# Patient Record
Sex: FEMALE | Race: WHITE | NOT HISPANIC OR LATINO | Employment: FULL TIME | URBAN - METROPOLITAN AREA
[De-identification: names, ages, dates, MRNs, and addresses within clinical notes are randomized per-mention and may not be internally consistent; named-entity substitution may affect disease eponyms.]

---

## 2023-01-28 PROBLEM — M54.17 RADICULITIS, LUMBOSACRAL: Status: ACTIVE | Noted: 2023-01-28

## 2023-01-28 PROBLEM — I25.10 ARTERIOSCLEROTIC HEART DISEASE (ASHD): Status: ACTIVE | Noted: 2023-01-28

## 2023-01-28 PROBLEM — F32.A DEPRESSION: Status: ACTIVE | Noted: 2023-01-28

## 2023-01-28 PROBLEM — H61.23 BILATERAL IMPACTED CERUMEN: Status: ACTIVE | Noted: 2023-01-28

## 2023-01-28 PROBLEM — E78.5 HYPERLIPIDEMIA: Status: ACTIVE | Noted: 2023-01-28

## 2023-01-28 PROBLEM — J06.9 VIRAL UPPER RESPIRATORY INFECTION: Status: ACTIVE | Noted: 2023-01-28

## 2023-01-28 PROBLEM — K21.9 GERD (GASTROESOPHAGEAL REFLUX DISEASE): Status: ACTIVE | Noted: 2023-01-28

## 2023-01-28 PROBLEM — M65.331 TRIGGER MIDDLE FINGER OF RIGHT HAND: Status: ACTIVE | Noted: 2023-01-28

## 2023-01-28 PROBLEM — R39.15 URGENCY OF URINATION: Status: ACTIVE | Noted: 2023-01-28

## 2023-01-28 PROBLEM — M79.643 HAND PAIN: Status: ACTIVE | Noted: 2023-01-28

## 2023-01-28 PROBLEM — R60.9 EDEMA: Status: ACTIVE | Noted: 2023-01-28

## 2023-01-28 PROBLEM — E55.9 VITAMIN D DEFICIENCY: Status: ACTIVE | Noted: 2023-01-28

## 2023-01-28 PROBLEM — M79.609 LIMB PAIN: Status: ACTIVE | Noted: 2023-01-28

## 2023-01-28 PROBLEM — M79.669 CALF PAIN: Status: ACTIVE | Noted: 2023-01-28

## 2023-01-28 PROBLEM — M65.342 TRIGGER RING FINGER OF LEFT HAND: Status: ACTIVE | Noted: 2023-01-28

## 2023-01-28 PROBLEM — R05.9 COUGH: Status: ACTIVE | Noted: 2023-01-28

## 2023-01-28 PROBLEM — M65.332 TRIGGER MIDDLE FINGER OF LEFT HAND: Status: ACTIVE | Noted: 2023-01-28

## 2023-01-28 PROBLEM — M79.662 PAIN OF LEFT CALF: Status: ACTIVE | Noted: 2023-01-28

## 2023-01-28 PROBLEM — S92.351A CLOSED FRACTURE OF FIFTH METATARSAL BONE OF RIGHT FOOT: Status: ACTIVE | Noted: 2023-01-28

## 2023-01-28 PROBLEM — I10 BENIGN ESSENTIAL HYPERTENSION: Status: ACTIVE | Noted: 2023-01-28

## 2023-01-28 RX ORDER — PAROXETINE 30 MG/1
1 TABLET, FILM COATED ORAL DAILY
COMMUNITY
Start: 2012-09-28 | End: 2023-03-24 | Stop reason: SDUPTHER

## 2023-01-28 RX ORDER — AMLODIPINE BESYLATE 5 MG/1
1 TABLET ORAL DAILY
COMMUNITY
Start: 2021-07-23 | End: 2024-02-23 | Stop reason: SDUPTHER

## 2023-01-28 RX ORDER — SPIRONOLACTONE 25 MG/1
1 TABLET ORAL DAILY
COMMUNITY
Start: 2021-10-14 | End: 2024-02-23 | Stop reason: SDUPTHER

## 2023-01-28 RX ORDER — ATORVASTATIN CALCIUM 40 MG/1
1 TABLET, FILM COATED ORAL NIGHTLY
COMMUNITY
Start: 2012-10-21 | End: 2024-03-04 | Stop reason: SDUPTHER

## 2023-01-28 RX ORDER — FIBER
1 TABLET ORAL DAILY
COMMUNITY
Start: 2013-09-20

## 2023-01-28 RX ORDER — METOPROLOL SUCCINATE 50 MG/1
1 TABLET, EXTENDED RELEASE ORAL DAILY
COMMUNITY
Start: 2012-09-28 | End: 2024-02-23 | Stop reason: SDUPTHER

## 2023-01-28 RX ORDER — LISINOPRIL 40 MG/1
1 TABLET ORAL DAILY
COMMUNITY
Start: 2012-10-21 | End: 2024-02-23 | Stop reason: SDUPTHER

## 2023-01-29 PROBLEM — Z04.9 CONDITION NOT FOUND: Status: ACTIVE | Noted: 2023-01-29

## 2023-03-13 ENCOUNTER — OFFICE VISIT (OUTPATIENT)
Dept: PRIMARY CARE | Facility: CLINIC | Age: 73
End: 2023-03-13
Payer: MEDICARE

## 2023-03-13 ENCOUNTER — LAB (OUTPATIENT)
Dept: LAB | Facility: LAB | Age: 73
End: 2023-03-13
Payer: MEDICARE

## 2023-03-13 VITALS — WEIGHT: 180 LBS | DIASTOLIC BLOOD PRESSURE: 85 MMHG | SYSTOLIC BLOOD PRESSURE: 145 MMHG | BODY MASS INDEX: 31.89 KG/M2

## 2023-03-13 DIAGNOSIS — F32.A DEPRESSION, UNSPECIFIED DEPRESSION TYPE: ICD-10-CM

## 2023-03-13 DIAGNOSIS — R53.83 OTHER FATIGUE: ICD-10-CM

## 2023-03-13 DIAGNOSIS — Z00.00 HEALTH CARE MAINTENANCE: ICD-10-CM

## 2023-03-13 DIAGNOSIS — I10 BENIGN ESSENTIAL HYPERTENSION: Primary | ICD-10-CM

## 2023-03-13 DIAGNOSIS — E78.2 MIXED HYPERLIPIDEMIA: ICD-10-CM

## 2023-03-13 DIAGNOSIS — I10 BENIGN ESSENTIAL HYPERTENSION: ICD-10-CM

## 2023-03-13 PROCEDURE — 3079F DIAST BP 80-89 MM HG: CPT | Performed by: INTERNAL MEDICINE

## 2023-03-13 PROCEDURE — 3077F SYST BP >= 140 MM HG: CPT | Performed by: INTERNAL MEDICINE

## 2023-03-13 PROCEDURE — 80061 LIPID PANEL: CPT

## 2023-03-13 PROCEDURE — 36415 COLL VENOUS BLD VENIPUNCTURE: CPT

## 2023-03-13 PROCEDURE — 80053 COMPREHEN METABOLIC PANEL: CPT

## 2023-03-13 PROCEDURE — 85027 COMPLETE CBC AUTOMATED: CPT

## 2023-03-13 PROCEDURE — 99214 OFFICE O/P EST MOD 30 MIN: CPT | Performed by: INTERNAL MEDICINE

## 2023-03-13 PROCEDURE — 1170F FXNL STATUS ASSESSED: CPT | Performed by: INTERNAL MEDICINE

## 2023-03-13 PROCEDURE — G0439 PPPS, SUBSEQ VISIT: HCPCS | Performed by: INTERNAL MEDICINE

## 2023-03-13 NOTE — PROGRESS NOTES
Subjective   Patient ID: Sully Chavez is a 73 y.o. female who presents for No chief complaint on file..    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 further away now rather enjoys that better bowels normal no dysuria bones muscles joints mostly aching at this point but not taking much in the way of medication for it except for over-the-counter    Past medical history hypertension hyperlipidemia fatigue depression and anxiety    Medications including SSRI    Allergies no known drug allergies    Social history no tobacco    Family history noted and unchanged    Prevention has had follow-up with cardiology some prior blood work review goes to [facility] for her mammogram some exercise    Depression not depressed but on medication    Review of Systems    Objective   There were no vitals taken for this visit.    Physical Exam vital signs noted alert and oriented x3 NCAT PERRLA EOMI nares without discharge OP benign TM normal bilateral EAC clear bilateral no AC nodes no JVD or bruit no thyromegaly chest clear to auscultation and percussion CV regular rate and rhythm S1-S2 without murmur gallop or rub abdomen soft nontender normal active bowel sounds no rebound or guarding no HSM LS spine normal curvature negative straight leg raise negative logroll negative SI joint tenderness extremities no clubbing cyanosis or edema normal distal pulses DTR 2+ and brisk musculoskeletal DJD changes hands without synovial effusion    Assessment/Plan impression General medical examination hypertension hyperlipidemia depression other diagnoses fatigue  Plan has follow-up with cardiology in June 2023 and EKG at that point in time diet weight loss exercise increase water consumption check Chem-7 advised on glucose potassium and kidney function advised on blood pressure blood pressure  medicine check hepatic panel lipid panel advised on cholesterol cholesterol medicine check CBC advised on blood count review prior findings for her thyroid function studies were other findings check on most recent colonoscopy follow-up at [facility] for mammogram sleep hygiene recheck 3 months based on labs TT 60 cc 31    Reviewed prior diagnoses and laboratory for TSH which was normal in 2017

## 2023-03-14 LAB
ALANINE AMINOTRANSFERASE (SGPT) (U/L) IN SER/PLAS: 18 U/L (ref 7–45)
ALBUMIN (G/DL) IN SER/PLAS: 4.3 G/DL (ref 3.4–5)
ALKALINE PHOSPHATASE (U/L) IN SER/PLAS: 88 U/L (ref 33–136)
ANION GAP IN SER/PLAS: 17 MMOL/L (ref 10–20)
ASPARTATE AMINOTRANSFERASE (SGOT) (U/L) IN SER/PLAS: 17 U/L (ref 9–39)
BILIRUBIN TOTAL (MG/DL) IN SER/PLAS: 0.7 MG/DL (ref 0–1.2)
CALCIUM (MG/DL) IN SER/PLAS: 9.8 MG/DL (ref 8.6–10.6)
CARBON DIOXIDE, TOTAL (MMOL/L) IN SER/PLAS: 24 MMOL/L (ref 21–32)
CHLORIDE (MMOL/L) IN SER/PLAS: 105 MMOL/L (ref 98–107)
CHOLESTEROL (MG/DL) IN SER/PLAS: 190 MG/DL (ref 0–199)
CHOLESTEROL IN HDL (MG/DL) IN SER/PLAS: 47.3 MG/DL
CHOLESTEROL/HDL RATIO: 4
CREATININE (MG/DL) IN SER/PLAS: 0.92 MG/DL (ref 0.5–1.05)
ERYTHROCYTE DISTRIBUTION WIDTH (RATIO) BY AUTOMATED COUNT: 12.4 % (ref 11.5–14.5)
ERYTHROCYTE MEAN CORPUSCULAR HEMOGLOBIN CONCENTRATION (G/DL) BY AUTOMATED: 32.9 G/DL (ref 32–36)
ERYTHROCYTE MEAN CORPUSCULAR VOLUME (FL) BY AUTOMATED COUNT: 90 FL (ref 80–100)
ERYTHROCYTES (10*6/UL) IN BLOOD BY AUTOMATED COUNT: 4.15 X10E12/L (ref 4–5.2)
GFR FEMALE: 65 ML/MIN/1.73M2
GLUCOSE (MG/DL) IN SER/PLAS: 82 MG/DL (ref 74–99)
HEMATOCRIT (%) IN BLOOD BY AUTOMATED COUNT: 37.4 % (ref 36–46)
HEMOGLOBIN (G/DL) IN BLOOD: 12.3 G/DL (ref 12–16)
LDL: 101 MG/DL (ref 0–99)
LEUKOCYTES (10*3/UL) IN BLOOD BY AUTOMATED COUNT: 7.8 X10E9/L (ref 4.4–11.3)
NON HDL CHOLESTEROL: 143 MG/DL
NRBC (PER 100 WBCS) BY AUTOMATED COUNT: 0 /100 WBC (ref 0–0)
PLATELETS (10*3/UL) IN BLOOD AUTOMATED COUNT: 293 X10E9/L (ref 150–450)
POTASSIUM (MMOL/L) IN SER/PLAS: 4.7 MMOL/L (ref 3.5–5.3)
PROTEIN TOTAL: 7 G/DL (ref 6.4–8.2)
SODIUM (MMOL/L) IN SER/PLAS: 141 MMOL/L (ref 136–145)
TRIGLYCERIDE (MG/DL) IN SER/PLAS: 209 MG/DL (ref 0–149)
UREA NITROGEN (MG/DL) IN SER/PLAS: 34 MG/DL (ref 6–23)
VLDL: 42 MG/DL (ref 0–40)

## 2023-03-21 ENCOUNTER — TELEPHONE (OUTPATIENT)
Dept: PRIMARY CARE | Facility: CLINIC | Age: 73
End: 2023-03-21
Payer: MEDICARE

## 2023-03-21 RX ORDER — PAROXETINE 30 MG/1
30 TABLET, FILM COATED ORAL EVERY MORNING
Qty: 90 TABLET | Refills: 3 | Status: CANCELLED | OUTPATIENT
Start: 2023-03-21

## 2023-03-24 DIAGNOSIS — Z00.00 HEALTH CARE MAINTENANCE: Primary | ICD-10-CM

## 2023-03-24 RX ORDER — PAROXETINE 30 MG/1
30 TABLET, FILM COATED ORAL EVERY MORNING
Qty: 90 TABLET | Refills: 1 | Status: SHIPPED | OUTPATIENT
Start: 2023-03-24 | End: 2023-12-16

## 2023-03-26 ASSESSMENT — PATIENT HEALTH QUESTIONNAIRE - PHQ9
1. LITTLE INTEREST OR PLEASURE IN DOING THINGS: NOT AT ALL
2. FEELING DOWN, DEPRESSED OR HOPELESS: NOT AT ALL
SUM OF ALL RESPONSES TO PHQ9 QUESTIONS 1 AND 2: 0

## 2023-03-26 ASSESSMENT — ACTIVITIES OF DAILY LIVING (ADL)
BATHING: INDEPENDENT
GROCERY_SHOPPING: INDEPENDENT
DOING_HOUSEWORK: INDEPENDENT
MANAGING_FINANCES: INDEPENDENT
TAKING_MEDICATION: INDEPENDENT
DRESSING: INDEPENDENT

## 2023-12-15 DIAGNOSIS — Z00.00 HEALTH CARE MAINTENANCE: ICD-10-CM

## 2023-12-16 RX ORDER — PAROXETINE 30 MG/1
30 TABLET, FILM COATED ORAL EVERY MORNING
Qty: 90 TABLET | Refills: 1 | Status: SHIPPED | OUTPATIENT
Start: 2023-12-16 | End: 2024-03-19 | Stop reason: SDUPTHER

## 2024-02-23 DIAGNOSIS — I10 BENIGN ESSENTIAL HYPERTENSION: Primary | ICD-10-CM

## 2024-02-23 RX ORDER — LISINOPRIL 40 MG/1
40 TABLET ORAL DAILY
Qty: 30 TABLET | Refills: 6 | Status: SHIPPED | OUTPATIENT
Start: 2024-02-23 | End: 2024-09-20

## 2024-02-23 RX ORDER — METOPROLOL SUCCINATE 50 MG/1
50 TABLET, EXTENDED RELEASE ORAL DAILY
Qty: 30 TABLET | Refills: 6 | Status: SHIPPED | OUTPATIENT
Start: 2024-02-23

## 2024-02-23 RX ORDER — AMLODIPINE BESYLATE 5 MG/1
5 TABLET ORAL DAILY
Qty: 90 TABLET | Refills: 2 | Status: SHIPPED | OUTPATIENT
Start: 2024-02-23

## 2024-02-23 RX ORDER — SPIRONOLACTONE 25 MG/1
25 TABLET ORAL DAILY
Qty: 30 TABLET | Refills: 6 | Status: SHIPPED | OUTPATIENT
Start: 2024-02-23 | End: 2024-09-20

## 2024-03-04 DIAGNOSIS — E78.2 MIXED HYPERLIPIDEMIA: ICD-10-CM

## 2024-03-04 RX ORDER — ATORVASTATIN CALCIUM 40 MG/1
40 TABLET, FILM COATED ORAL NIGHTLY
Qty: 90 TABLET | Refills: 3 | Status: SHIPPED | OUTPATIENT
Start: 2024-03-04 | End: 2024-05-01 | Stop reason: SDUPTHER

## 2024-03-06 DIAGNOSIS — E78.2 MIXED HYPERLIPIDEMIA: ICD-10-CM

## 2024-03-06 DIAGNOSIS — Z00.00 HEALTH CARE MAINTENANCE: Primary | ICD-10-CM

## 2024-03-06 DIAGNOSIS — I10 BENIGN ESSENTIAL HYPERTENSION: ICD-10-CM

## 2024-03-14 ENCOUNTER — LAB (OUTPATIENT)
Dept: LAB | Facility: LAB | Age: 74
End: 2024-03-14
Payer: MEDICARE

## 2024-03-14 DIAGNOSIS — I10 BENIGN ESSENTIAL HYPERTENSION: ICD-10-CM

## 2024-03-14 DIAGNOSIS — E78.2 MIXED HYPERLIPIDEMIA: ICD-10-CM

## 2024-03-14 LAB
ALBUMIN SERPL BCP-MCNC: 4.4 G/DL (ref 3.4–5)
ALP SERPL-CCNC: 101 U/L (ref 33–136)
ALT SERPL W P-5'-P-CCNC: 23 U/L (ref 7–45)
ANION GAP SERPL CALC-SCNC: 16 MMOL/L (ref 10–20)
AST SERPL W P-5'-P-CCNC: 24 U/L (ref 9–39)
BILIRUB SERPL-MCNC: 1.1 MG/DL (ref 0–1.2)
BUN SERPL-MCNC: 22 MG/DL (ref 6–23)
CALCIUM SERPL-MCNC: 10 MG/DL (ref 8.6–10.3)
CHLORIDE SERPL-SCNC: 102 MMOL/L (ref 98–107)
CHOLEST SERPL-MCNC: 166 MG/DL (ref 0–199)
CHOLESTEROL/HDL RATIO: 3.5
CO2 SERPL-SCNC: 27 MMOL/L (ref 21–32)
CREAT SERPL-MCNC: 0.99 MG/DL (ref 0.5–1.05)
EGFRCR SERPLBLD CKD-EPI 2021: 60 ML/MIN/1.73M*2
ERYTHROCYTE [DISTWIDTH] IN BLOOD BY AUTOMATED COUNT: 13 % (ref 11.5–14.5)
GLUCOSE SERPL-MCNC: 96 MG/DL (ref 74–99)
HCT VFR BLD AUTO: 38.7 % (ref 36–46)
HDLC SERPL-MCNC: 47 MG/DL
HGB BLD-MCNC: 12.8 G/DL (ref 12–16)
LDLC SERPL CALC-MCNC: 83 MG/DL
MCH RBC QN AUTO: 28.8 PG (ref 26–34)
MCHC RBC AUTO-ENTMCNC: 33.1 G/DL (ref 32–36)
MCV RBC AUTO: 87 FL (ref 80–100)
NON HDL CHOLESTEROL: 119 MG/DL (ref 0–149)
NRBC BLD-RTO: 0 /100 WBCS (ref 0–0)
PLATELET # BLD AUTO: 307 X10*3/UL (ref 150–450)
POTASSIUM SERPL-SCNC: 4.8 MMOL/L (ref 3.5–5.3)
PROT SERPL-MCNC: 7 G/DL (ref 6.4–8.2)
RBC # BLD AUTO: 4.44 X10*6/UL (ref 4–5.2)
SODIUM SERPL-SCNC: 140 MMOL/L (ref 136–145)
TRIGL SERPL-MCNC: 182 MG/DL (ref 0–149)
VLDL: 36 MG/DL (ref 0–40)
WBC # BLD AUTO: 7.7 X10*3/UL (ref 4.4–11.3)

## 2024-03-14 PROCEDURE — 80061 LIPID PANEL: CPT

## 2024-03-14 PROCEDURE — 80053 COMPREHEN METABOLIC PANEL: CPT

## 2024-03-14 PROCEDURE — 36415 COLL VENOUS BLD VENIPUNCTURE: CPT

## 2024-03-14 PROCEDURE — 85027 COMPLETE CBC AUTOMATED: CPT

## 2024-03-19 ENCOUNTER — OFFICE VISIT (OUTPATIENT)
Dept: PRIMARY CARE | Facility: CLINIC | Age: 74
End: 2024-03-19
Payer: MEDICARE

## 2024-03-19 VITALS — WEIGHT: 182 LBS | DIASTOLIC BLOOD PRESSURE: 76 MMHG | SYSTOLIC BLOOD PRESSURE: 128 MMHG | BODY MASS INDEX: 32.24 KG/M2

## 2024-03-19 DIAGNOSIS — R53.83 OTHER FATIGUE: ICD-10-CM

## 2024-03-19 DIAGNOSIS — E78.2 MIXED HYPERLIPIDEMIA: ICD-10-CM

## 2024-03-19 DIAGNOSIS — Z00.00 HEALTH CARE MAINTENANCE: ICD-10-CM

## 2024-03-19 DIAGNOSIS — M19.079 PRIMARY OSTEOARTHRITIS OF FOOT, UNSPECIFIED LATERALITY: ICD-10-CM

## 2024-03-19 DIAGNOSIS — I10 BENIGN ESSENTIAL HYPERTENSION: Primary | ICD-10-CM

## 2024-03-19 DIAGNOSIS — F32.A DEPRESSION, UNSPECIFIED DEPRESSION TYPE: ICD-10-CM

## 2024-03-19 DIAGNOSIS — K21.9 GASTROESOPHAGEAL REFLUX DISEASE WITHOUT ESOPHAGITIS: ICD-10-CM

## 2024-03-19 PROCEDURE — G0439 PPPS, SUBSEQ VISIT: HCPCS | Performed by: INTERNAL MEDICINE

## 2024-03-19 PROCEDURE — 1160F RVW MEDS BY RX/DR IN RCRD: CPT | Performed by: INTERNAL MEDICINE

## 2024-03-19 PROCEDURE — 1170F FXNL STATUS ASSESSED: CPT | Performed by: INTERNAL MEDICINE

## 2024-03-19 PROCEDURE — 1159F MED LIST DOCD IN RCRD: CPT | Performed by: INTERNAL MEDICINE

## 2024-03-19 PROCEDURE — 3078F DIAST BP <80 MM HG: CPT | Performed by: INTERNAL MEDICINE

## 2024-03-19 PROCEDURE — 1036F TOBACCO NON-USER: CPT | Performed by: INTERNAL MEDICINE

## 2024-03-19 PROCEDURE — 99397 PER PM REEVAL EST PAT 65+ YR: CPT | Performed by: INTERNAL MEDICINE

## 2024-03-19 PROCEDURE — 99214 OFFICE O/P EST MOD 30 MIN: CPT | Performed by: INTERNAL MEDICINE

## 2024-03-19 PROCEDURE — 3074F SYST BP LT 130 MM HG: CPT | Performed by: INTERNAL MEDICINE

## 2024-03-19 RX ORDER — PAROXETINE 30 MG/1
30 TABLET, FILM COATED ORAL EVERY MORNING
Qty: 90 TABLET | Refills: 1 | Status: SHIPPED | OUTPATIENT
Start: 2024-03-19

## 2024-03-19 NOTE — PROGRESS NOTES
Subjective   Patient ID: Sully Chavez is a 74 y.o. female who presents for No chief complaint on file..    HPI CPE see updated front sheet no chest pain no shortness of breath no side effect with medication doing well with a sinus RI reflux is mostly under control desires no further colonoscopies has taken a fall on some ice accidentally no injuries in the past not recently some sinus with cough no fevers has not had COVID bowels normal no dysuria    Past medical history hypertension hyperlipidemia fatigue depression and anxiety    Medications including SSRI blood pressure medication    Allergies no known drug allergies    Social history no tobacco    Family history noted and unchanged no family history of colon cancer    Prevention has had follow-up with cardiology some prior blood work review goes to [facility] for her mammogram some exercise did not want colonoscopy or Cologuard    Depression not depressed but on medication    Review of Systems    Objective   There were no vitals taken for this visit.    Physical Exam vital signs noted alert and oriented x3 NCAT PERRLA EOMI nares clear discharge OP benign TM normal bilateral EAC clear bilateral no AC nodes no JVD or bruit no thyromegaly chest clear to auscultation and percussion CV regular rate and rhythm S1-S2 without murmur gallop or rub abdomen soft nontender normal active bowel sounds no rebound or guarding no HSM LS spine normal curvature negative straight leg raise negative logroll negative SI joint tenderness extremities no clubbing cyanosis or edema normal distal pulses DTR 2+ and brisk musculoskeletal feet DJD changes first MTP changes and flat arches     Assessment/Plan impression General medical examination hypertension hyperlipidemia DJD feet depression other diagnoses GERD  Plan takes Tylenol for the feet good shoe wear no surgery planned May use arch supports prior blood work reviewed in detail with her similar to 2 years ago no further colon  screening per her okay follow-up for mammogram diet weight loss exercise renew SSRI see EMR follow-up with cardiology avoid NSAIDs Tylenol has been adequate as above PPI as needed continue with blood pressure and cholesterol management and medication try not eating late at night and recheck 3 months based on labs TT 60 cc 31

## 2024-03-24 ASSESSMENT — ACTIVITIES OF DAILY LIVING (ADL)
GROCERY_SHOPPING: INDEPENDENT
BATHING: INDEPENDENT
DRESSING: INDEPENDENT
DOING_HOUSEWORK: INDEPENDENT
TAKING_MEDICATION: INDEPENDENT
MANAGING_FINANCES: INDEPENDENT

## 2024-03-24 ASSESSMENT — ENCOUNTER SYMPTOMS
LOSS OF SENSATION IN FEET: 0
DEPRESSION: 0
OCCASIONAL FEELINGS OF UNSTEADINESS: 0

## 2024-03-24 ASSESSMENT — PATIENT HEALTH QUESTIONNAIRE - PHQ9
SUM OF ALL RESPONSES TO PHQ9 QUESTIONS 1 AND 2: 1
10. IF YOU CHECKED OFF ANY PROBLEMS, HOW DIFFICULT HAVE THESE PROBLEMS MADE IT FOR YOU TO DO YOUR WORK, TAKE CARE OF THINGS AT HOME, OR GET ALONG WITH OTHER PEOPLE: NOT DIFFICULT AT ALL
2. FEELING DOWN, DEPRESSED OR HOPELESS: SEVERAL DAYS
1. LITTLE INTEREST OR PLEASURE IN DOING THINGS: NOT AT ALL

## 2024-03-27 ENCOUNTER — OFFICE VISIT (OUTPATIENT)
Dept: ORTHOPEDIC SURGERY | Facility: HOSPITAL | Age: 74
End: 2024-03-27
Payer: MEDICARE

## 2024-03-27 ENCOUNTER — HOSPITAL ENCOUNTER (OUTPATIENT)
Dept: RADIOLOGY | Facility: HOSPITAL | Age: 74
Discharge: HOME | End: 2024-03-27
Payer: MEDICARE

## 2024-03-27 VITALS — WEIGHT: 180 LBS | HEIGHT: 63 IN | BODY MASS INDEX: 31.89 KG/M2

## 2024-03-27 DIAGNOSIS — M25.562 LEFT KNEE PAIN, UNSPECIFIED CHRONICITY: ICD-10-CM

## 2024-03-27 PROCEDURE — 99213 OFFICE O/P EST LOW 20 MIN: CPT | Performed by: PHYSICIAN ASSISTANT

## 2024-03-27 PROCEDURE — 1160F RVW MEDS BY RX/DR IN RCRD: CPT | Performed by: PHYSICIAN ASSISTANT

## 2024-03-27 PROCEDURE — 73564 X-RAY EXAM KNEE 4 OR MORE: CPT | Mod: LEFT SIDE | Performed by: RADIOLOGY

## 2024-03-27 PROCEDURE — 1125F AMNT PAIN NOTED PAIN PRSNT: CPT | Performed by: PHYSICIAN ASSISTANT

## 2024-03-27 PROCEDURE — 73564 X-RAY EXAM KNEE 4 OR MORE: CPT | Mod: LT

## 2024-03-27 PROCEDURE — 1159F MED LIST DOCD IN RCRD: CPT | Performed by: PHYSICIAN ASSISTANT

## 2024-03-27 RX ORDER — METHYLPREDNISOLONE 4 MG/1
4 TABLET ORAL ONCE
Qty: 21 TABLET | Refills: 0 | Status: SHIPPED | OUTPATIENT
Start: 2024-03-27 | End: 2024-03-27

## 2024-03-27 ASSESSMENT — PAIN - FUNCTIONAL ASSESSMENT: PAIN_FUNCTIONAL_ASSESSMENT: 0-10

## 2024-03-27 ASSESSMENT — PAIN DESCRIPTION - DESCRIPTORS: DESCRIPTORS: ACHING

## 2024-03-27 ASSESSMENT — PAIN SCALES - GENERAL: PAINLEVEL_OUTOF10: 8

## 2024-04-04 NOTE — PROGRESS NOTES
HPI  This is a pleasant 74 y.o. female here today for left knee pain.  She presents to the orthopedic injury clinic for pain in the left knee that started this morning.  She works in the gift shop.  She states she took a step and suddenly had pain in the knee.  She continued to have pain with weightbearing in her colleagues suggested she come be evaluated in the injury clinic.   She presents today in a wheelchair secondary to the pain.  It is predominantly on the knee but feels some radiation up the outside aspect of the leg.  She has no discomfort when sitting.  She denies any groin pain.  There is no numbness or tingling.        Past Medical History:   Diagnosis Date    Personal history of other diseases of the digestive system     History of esophageal reflux       No past surgical history on file.    Social History     Tobacco Use    Smoking status: Never    Smokeless tobacco: Never   Vaping Use    Vaping Use: Never used   Substance Use Topics    Alcohol use: Not Currently    Drug use: Never           ROS  Reviewed and all pertinent positives mentioned in HPI.      EXAM  Patient is in no acute distress.  They are alert and oriented x3.  They are of normal mood and affect.  They are in no acute distress.  The patient's limb is warm and well-perfused.  They have intact sensation to light touch in all lower extremity dermatomes.  There is a minimal effusion.    The patient's quadriceps and hamstring strength is 5 of 5.    The patient can do a straight leg raise with no radicular pain.  negative lachmans. negative posterior drawer.  There is 1+ patellofemoral crepitus.   The knee is stable to varus and valgus stress at both 0 and 30°.  There is no tenderness along the medial or lateral joint line.  negative McMurrays      IMAGING  Imaging reviewed today reveal no gross fracture or dislocation.  Degenerative changes noted in the in all compartments.  MRI reviewed reveals No MRI for review      ASSESSMENT  left  patellofemoral arthrosis      PLAN  We will have the patient initiate a course of physical therapy and continue NSAIDs and activity modification.  If symptoms persist, we will see them back for re-evaluation.          Monik Oates PA-C

## 2024-04-29 ENCOUNTER — TELEPHONE (OUTPATIENT)
Dept: CARDIOLOGY | Facility: CLINIC | Age: 74
End: 2024-04-29
Payer: MEDICARE

## 2024-04-29 NOTE — TELEPHONE ENCOUNTER
Received a fax refill request from [facility] for Atorvastatin. Per EPIC list this has been prescribed March 2024 to [facility]. Left a  VM for the patient to call us in regards to the need for refill

## 2024-04-30 NOTE — TELEPHONE ENCOUNTER
Pt wants all scripts to go to [facility], not [facility]. [facility]. Pt states she does not require any refills at this time.

## 2024-05-01 DIAGNOSIS — E78.2 MIXED HYPERLIPIDEMIA: ICD-10-CM

## 2024-05-01 RX ORDER — ATORVASTATIN CALCIUM 40 MG/1
40 TABLET, FILM COATED ORAL NIGHTLY
Qty: 90 TABLET | Refills: 1 | Status: SHIPPED | OUTPATIENT
Start: 2024-05-01

## 2024-06-04 ENCOUNTER — OFFICE VISIT (OUTPATIENT)
Dept: CARDIOLOGY | Facility: CLINIC | Age: 74
End: 2024-06-04
Payer: MEDICARE

## 2024-06-04 VITALS
DIASTOLIC BLOOD PRESSURE: 81 MMHG | BODY MASS INDEX: 30.22 KG/M2 | HEART RATE: 67 BPM | WEIGHT: 177 LBS | OXYGEN SATURATION: 98 % | SYSTOLIC BLOOD PRESSURE: 165 MMHG | HEIGHT: 64 IN

## 2024-06-04 DIAGNOSIS — I10 BENIGN ESSENTIAL HYPERTENSION: ICD-10-CM

## 2024-06-04 PROCEDURE — 93005 ELECTROCARDIOGRAM TRACING: CPT | Performed by: INTERNAL MEDICINE

## 2024-06-04 PROCEDURE — 99213 OFFICE O/P EST LOW 20 MIN: CPT | Performed by: INTERNAL MEDICINE

## 2024-06-04 ASSESSMENT — COLUMBIA-SUICIDE SEVERITY RATING SCALE - C-SSRS
1. IN THE PAST MONTH, HAVE YOU WISHED YOU WERE DEAD OR WISHED YOU COULD GO TO SLEEP AND NOT WAKE UP?: NO
6. HAVE YOU EVER DONE ANYTHING, STARTED TO DO ANYTHING, OR PREPARED TO DO ANYTHING TO END YOUR LIFE?: NO
2. HAVE YOU ACTUALLY HAD ANY THOUGHTS OF KILLING YOURSELF?: NO

## 2024-06-04 ASSESSMENT — ENCOUNTER SYMPTOMS
LOSS OF SENSATION IN FEET: 0
DEPRESSION: 0
OCCASIONAL FEELINGS OF UNSTEADINESS: 0

## 2024-06-04 ASSESSMENT — PAIN SCALES - GENERAL: PAINLEVEL: 0-NO PAIN

## 2024-06-04 NOTE — PROGRESS NOTES
"Primary Care Physician: Timmy Velazquez MD  Date of Visit: 06/04/2024  9:40 AM EDT  [place] [facility] [place] [address]     Chief Complaint:   Chief Complaint   Patient presents with    Follow-up    Hyperlipidemia    Hypertension     ASHD        HPI / Summary:   Sully Chavez is a 74 y.o. female presents for followup.       Calcium score elevation, HTN, DL  CCA 2007 584    No PINEDA, CP, palpitations, edema.  Ldl 83 in March  Gerd better  Bp a bit high.    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    Specialty Problems          Cardiovascular    Arteriosclerotic heart disease (ASHD)     Elevated calcium score of 584 in Nov 2007.         Benign essential hypertension    Cough    Hyperlipidemia    Radiculitis, lumbosacral        Past Medical History:   Diagnosis Date    Personal history of other diseases of the digestive system     History of esophageal reflux          No past surgical history on file.       Social History:   reports that she has never smoked. She has never used smokeless tobacco. She reports that she does not currently use alcohol. She reports that she does not use drugs.      Allergies:  No Known Allergies    Outpatient Medications:  Current Outpatient Medications   Medication Instructions    ADULT ASPIRIN EC LOW STRENGTH ORAL 81 mg, oral, Daily    amLODIPine (NORVASC) 5 mg, oral, Daily    atorvastatin (LIPITOR) 40 mg, oral, Nightly    lisinopril 40 mg, oral, Daily    metoprolol succinate XL (TOPROL-XL) 50 mg, oral, Daily    multivitamin-min-iron-FA-vit K (Multi-Day Plus Minerals) 18 mg iron-400 mcg-25 mcg tablet 1 tablet, oral, Daily    PARoxetine (PAXIL) 30 mg, oral, Every morning    spironolactone (ALDACTONE) 25 mg, oral, Daily       ROS     Physical Exam:  Vitals:    06/04/24 0955   BP: 165/81   BP Location: Left arm   Patient Position: Sitting   BP Cuff Size: Adult   Pulse: 67   SpO2: 98%   Weight: 80.3 kg (177 lb)   Height: 1.613 m (5' 3.5\")     Wt Readings from Last 5 Encounters: " "  06/04/24 80.3 kg (177 lb)   03/27/24 81.6 kg (180 lb)   03/19/24 82.6 kg (182 lb)   06/16/23 83.1 kg (183 lb 4 oz)   03/13/23 81.6 kg (180 lb)     Body mass index is 30.86 kg/m².   Well-developed well-nourished patient in no acute distress.  Flat JVP.  Regular rhythm no gallop  Clear lungs.  Soft abdomen.  No dependent edema with intact pedal pulses     Last Labs:  CMP:  Recent Labs     03/14/24  0804 03/13/23  1356 06/29/22  0943 10/21/21  0824 07/19/21  0804    141 140 139 138   K 4.8 4.7 4.3 3.9 4.1    105 104 103 105   CO2 27 24 27 25 27   ANIONGAP 16 17 13 15 10   BUN 22 34* 29* 20 20   CREATININE 0.99 0.92 0.94 0.84 0.71   EGFR 60*  --   --   --   --    GLUCOSE 96 82 101* 99 98     Recent Labs     03/14/24  0804 03/13/23  1356 06/29/22  0943 07/19/21  0804 12/09/17  1023   ALBUMIN 4.4 4.3 4.4 4.1  --    ALKPHOS 101 88 96 94  --    ALT 23 18 17 16 22   AST 24 17 16 18  --    BILITOT 1.1 0.7 0.7 0.8  --      CBC:  Recent Labs     03/14/24  0804 03/13/23  1356 06/29/22  0943 07/19/21  0804 12/09/17  1023   WBC 7.7 7.8 9.1 8.4 6.3   HGB 12.8 12.3 13.9 13.6 12.8   HCT 38.7 37.4 39.8 41.4 39.2    293 292 290 251   MCV 87 90 86 88 88     COAG: No results for input(s): \"INR\", \"DDIMERVTE\" in the last 16656 hours.  HEME/ENDO:  Recent Labs     12/09/17  1023   TSH 3.19      CARDIAC: No results for input(s): \"LDH\", \"CKMB\", \"TROPHS\", \"BNP\" in the last 16594 hours.    No lab exists for component: \"CK\", \"CKMBP\"  Recent Labs     03/14/24  0804 03/13/23  1356 06/29/22  0943 07/19/21  0804   CHOL 166 190 165 150   LDLF  --  101* 83 78   HDL 47.0 47.3 50.1 46.7   TRIG 182* 209* 161* 129       Last Cardiology Tests:  ECG:      Echo:  Echo Results:  No results found for this or any previous visit from the past 3650 days.       Cath:      Stress Test:  Stress Results:  No results found for this or any previous visit from the past 365 days.         Cardiac Imaging:  XR knee left 4+ views  Narrative: Interpreted " By:  Marvin Hussein,   STUDY:  XR KNEE LEFT 4+ VIEWS      INDICATION:  Signs/Symptoms:left knee pain.      COMPARISON:  None      ACCESSION NUMBER(S):  [id number]      ORDERING CLINICIAN:  ANNETTE LOUISE      FINDINGS:  Mild osteoarthritis left knee. No fracture or lesion seen.      Impression: Mild left knee osteoarthritis.      Signed by: Marvin Hussein 3/28/2024 5:47 PM  Dictation workstation:   [id number]        Assessment/Plan   Generally seems to be doing well from a cardiovascular risk perspective.  She is on aggressive antihypertensive regimen but she is somewhat hypertensive today.  I asked her to do a better job keeping a diary of her blood pressures and discussing it with her PCP.  I have made no med changes and suggested to follow-up with us annually.  She continues to have a lot of social engagements and work at the [employer]        Orders:  Orders Placed This Encounter   Procedures    ECG 12 lead (Clinic Performed)      Followup Appts:  No future appointments.        ____________________________________________________________  Andres Henry MD    Senior Attending Physician  [facility]  [facility]

## 2024-06-04 NOTE — PROGRESS NOTES
Subjective   [unfilled]     Chief Complaint:  Chief Complaint   Patient presents with    Follow-up    Hyperlipidemia    Hypertension     ASHD        HPI      ROS      Objective   Physical Exam    Lab Review:     Recent Labs     03/14/24  0804 03/13/23  1356 06/29/22  0943 07/19/21  0804   CHOL 166 190 165 150   LDLF  --  101* 83 78   HDL 47.0 47.3 50.1 46.7   TRIG 182* 209* 161* 129       Assessment/Plan   The encounter diagnosis was Pre-diabetes.    Andres Henry MD

## 2024-06-06 LAB
ATRIAL RATE: 62 BPM
P AXIS: 66 DEGREES
P OFFSET: 205 MS
P ONSET: 145 MS
PR INTERVAL: 150 MS
Q ONSET: 220 MS
QRS COUNT: 10 BEATS
QRS DURATION: 74 MS
QT INTERVAL: 398 MS
QTC CALCULATION(BAZETT): 403 MS
QTC FREDERICIA: 402 MS
R AXIS: 59 DEGREES
T AXIS: 68 DEGREES
T OFFSET: 419 MS
VENTRICULAR RATE: 62 BPM

## 2024-07-14 DIAGNOSIS — E78.2 MIXED HYPERLIPIDEMIA: ICD-10-CM

## 2024-07-15 RX ORDER — ATORVASTATIN CALCIUM 40 MG/1
40 TABLET, FILM COATED ORAL NIGHTLY
Qty: 100 TABLET | Refills: 2 | Status: SHIPPED | OUTPATIENT
Start: 2024-07-15

## 2024-08-22 ENCOUNTER — TELEPHONE (OUTPATIENT)
Dept: PRIMARY CARE | Facility: CLINIC | Age: 74
End: 2024-08-22

## 2024-10-08 DIAGNOSIS — I10 BENIGN ESSENTIAL HYPERTENSION: ICD-10-CM

## 2024-10-08 RX ORDER — METOPROLOL SUCCINATE 50 MG/1
50 TABLET, EXTENDED RELEASE ORAL DAILY
Qty: 100 TABLET | Refills: 2 | Status: SHIPPED | OUTPATIENT
Start: 2024-10-08

## 2024-10-08 RX ORDER — LISINOPRIL 40 MG/1
40 TABLET ORAL DAILY
Qty: 100 TABLET | Refills: 2 | Status: SHIPPED | OUTPATIENT
Start: 2024-10-08

## 2024-10-08 RX ORDER — AMLODIPINE BESYLATE 5 MG/1
5 TABLET ORAL DAILY
Qty: 100 TABLET | Refills: 2 | Status: SHIPPED | OUTPATIENT
Start: 2024-10-08

## 2024-10-08 RX ORDER — SPIRONOLACTONE 25 MG/1
25 TABLET ORAL DAILY
Qty: 100 TABLET | Refills: 2 | Status: SHIPPED | OUTPATIENT
Start: 2024-10-08

## 2025-02-18 DIAGNOSIS — E78.2 MIXED HYPERLIPIDEMIA: ICD-10-CM

## 2025-02-19 RX ORDER — ATORVASTATIN CALCIUM 40 MG/1
40 TABLET, FILM COATED ORAL NIGHTLY
Qty: 100 TABLET | Refills: 2 | Status: SHIPPED | OUTPATIENT
Start: 2025-02-19

## 2025-06-04 ENCOUNTER — OFFICE VISIT (OUTPATIENT)
Dept: CARDIOLOGY | Facility: CLINIC | Age: 75
End: 2025-06-04
Payer: MEDICARE

## 2025-06-04 VITALS
BODY MASS INDEX: 29.23 KG/M2 | WEIGHT: 165 LBS | HEIGHT: 63 IN | SYSTOLIC BLOOD PRESSURE: 127 MMHG | OXYGEN SATURATION: 97 % | HEART RATE: 65 BPM | DIASTOLIC BLOOD PRESSURE: 73 MMHG

## 2025-06-04 DIAGNOSIS — I10 BENIGN ESSENTIAL HYPERTENSION: ICD-10-CM

## 2025-06-04 DIAGNOSIS — I25.10 ARTERIOSCLEROTIC HEART DISEASE (ASHD): Primary | ICD-10-CM

## 2025-06-04 PROCEDURE — 3074F SYST BP LT 130 MM HG: CPT | Performed by: INTERNAL MEDICINE

## 2025-06-04 PROCEDURE — 99212 OFFICE O/P EST SF 10 MIN: CPT | Performed by: INTERNAL MEDICINE

## 2025-06-04 PROCEDURE — 1160F RVW MEDS BY RX/DR IN RCRD: CPT | Performed by: INTERNAL MEDICINE

## 2025-06-04 PROCEDURE — 93005 ELECTROCARDIOGRAM TRACING: CPT | Performed by: INTERNAL MEDICINE

## 2025-06-04 PROCEDURE — 1126F AMNT PAIN NOTED NONE PRSNT: CPT | Performed by: INTERNAL MEDICINE

## 2025-06-04 PROCEDURE — 1159F MED LIST DOCD IN RCRD: CPT | Performed by: INTERNAL MEDICINE

## 2025-06-04 PROCEDURE — 99214 OFFICE O/P EST MOD 30 MIN: CPT | Performed by: INTERNAL MEDICINE

## 2025-06-04 PROCEDURE — 3078F DIAST BP <80 MM HG: CPT | Performed by: INTERNAL MEDICINE

## 2025-06-04 PROCEDURE — 1036F TOBACCO NON-USER: CPT | Performed by: INTERNAL MEDICINE

## 2025-06-04 RX ORDER — SPIRONOLACTONE 25 MG/1
25 TABLET ORAL DAILY
Qty: 100 TABLET | Refills: 2 | Status: SHIPPED | OUTPATIENT
Start: 2025-06-04

## 2025-06-04 RX ORDER — LISINOPRIL 40 MG/1
40 TABLET ORAL DAILY
Qty: 100 TABLET | Refills: 2 | Status: SHIPPED | OUTPATIENT
Start: 2025-06-04

## 2025-06-04 RX ORDER — AMLODIPINE BESYLATE 5 MG/1
5 TABLET ORAL DAILY
Qty: 100 TABLET | Refills: 2 | Status: SHIPPED | OUTPATIENT
Start: 2025-06-04

## 2025-06-04 RX ORDER — METOPROLOL SUCCINATE 50 MG/1
50 TABLET, EXTENDED RELEASE ORAL DAILY
Qty: 100 TABLET | Refills: 2 | Status: SHIPPED | OUTPATIENT
Start: 2025-06-04

## 2025-06-04 ASSESSMENT — COLUMBIA-SUICIDE SEVERITY RATING SCALE - C-SSRS
1. IN THE PAST MONTH, HAVE YOU WISHED YOU WERE DEAD OR WISHED YOU COULD GO TO SLEEP AND NOT WAKE UP?: NO
2. HAVE YOU ACTUALLY HAD ANY THOUGHTS OF KILLING YOURSELF?: NO
6. HAVE YOU EVER DONE ANYTHING, STARTED TO DO ANYTHING, OR PREPARED TO DO ANYTHING TO END YOUR LIFE?: NO

## 2025-06-04 ASSESSMENT — ENCOUNTER SYMPTOMS
DEPRESSION: 0
LOSS OF SENSATION IN FEET: 1
OCCASIONAL FEELINGS OF UNSTEADINESS: 0

## 2025-06-04 ASSESSMENT — PAIN SCALES - GENERAL: PAINLEVEL_OUTOF10: 0-NO PAIN

## 2025-06-04 NOTE — PROGRESS NOTES
"Primary Care Physician: Timmy Velazquez MD  Date of Visit: 06/04/2025 10:00 AM EDT  Location of visit: [facility] [place] [address]     Chief Complaint:   No chief complaint on file.    ASCVD risk assessment.  Recent labs reviewed.  Recent medical condition reviewed.    HPI / Summary:   Sully Chavez is a 75 y.o. female presents for new cardiovascular evaluation. Andres Henry MD   Calcium score of 584 in 2007.  Annual follow-up.  HTN, DL  Labs March 2014 cholesterol 166 HDL 47 LDL 83 triglycerides 182 non-HDL of 1 9  Specialty Problems          Cardiology Problems    Arteriosclerotic heart disease (ASHD)    Elevated calcium score of 584 in Nov 2007.         Benign essential hypertension    Hyperlipidemia        Medical History[1]       Surgical History[2]       Social History:   reports that she has never smoked. She has never used smokeless tobacco. She reports that she does not currently use alcohol. She reports that she does not use drugs.      Allergies:  RX Allergies[3]    Outpatient Medications:  Current Outpatient Medications   Medication Instructions    ADULT ASPIRIN EC LOW STRENGTH ORAL 81 mg, Daily    amLODIPine (NORVASC) 5 mg, oral, Daily    atorvastatin (LIPITOR) 40 mg, oral, Nightly    lisinopril 40 mg, oral, Daily    metoprolol succinate XL (TOPROL-XL) 50 mg, oral, Daily    multivitamin-min-iron-FA-vit K (Multi-Day Plus Minerals) 18 mg iron-400 mcg-25 mcg tablet 1 tablet, Daily    PARoxetine (PAXIL) 30 mg, oral, Daily before breakfast    spironolactone (ALDACTONE) 25 mg, oral, Daily       ROS     Physical Exam:  Vitals:    06/04/25 0949   BP: 127/73   Pulse: 65   SpO2: 97%   Weight: 74.8 kg (165 lb)   Height: 1.6 m (5' 3\")     Wt Readings from Last 5 Encounters:   06/04/25 74.8 kg (165 lb)   06/04/24 80.3 kg (177 lb)   03/27/24 81.6 kg (180 lb)   03/19/24 82.6 kg (182 lb)   06/16/23 83.1 kg (183 lb 4 oz)     Body mass index is 29.23 kg/m².   Physical Exam   Well-appearing female in no acute distress.  Skin " "is warm and dry.  Neck veins are flat.  Normal carotid upstrokes no bruits.  Regular rhythm without gallop or murmur.  Clear lungs without crackles.  Soft abdomen without masses.  Dependent edema with intact pedal pulses.  Abs:  CMP:  Recent Labs     03/14/24  0804 03/13/23  1356 06/29/22  0943 10/21/21  0824 07/19/21  0804    141 140 139 138   K 4.8 4.7 4.3 3.9 4.1    105 104 103 105   CO2 27 24 27 25 27   ANIONGAP 16 17 13 15 10   BUN 22 34* 29* 20 20   CREATININE 0.99 0.92 0.94 0.84 0.71   EGFR 60*  --   --   --   --    GLUCOSE 96 82 101* 99 98     Recent Labs     03/14/24  0804 03/13/23  1356 06/29/22  0943 07/19/21  0804 12/09/17  1023   ALBUMIN 4.4 4.3 4.4 4.1  --    ALKPHOS 101 88 96 94  --    ALT 23 18 17 16 22   AST 24 17 16 18  --    BILITOT 1.1 0.7 0.7 0.8  --      CBC:  Recent Labs     03/14/24  0804 03/13/23  1356 06/29/22  0943 07/19/21  0804 12/09/17  1023   WBC 7.7 7.8 9.1 8.4 6.3   HGB 12.8 12.3 13.9 13.6 12.8   HCT 38.7 37.4 39.8 41.4 39.2    293 292 290 251   MCV 87 90 86 88 88     COAG: No results for input(s): \"INR\", \"DDIMERVTE\" in the last 82941 hours.  HEME/ENDO:  Recent Labs     12/09/17  1023   TSH 3.19      CARDIAC: No results for input(s): \"LDH\", \"CKMB\", \"TROPHS\", \"BNP\" in the last 65024 hours.    No lab exists for component: \"CK\", \"CKMBP\"  Recent Labs     03/14/24  0804 03/13/23  1356 06/29/22  0943 07/19/21  0804   CHOL 166 190 165 150   LDLF  --  101* 83 78   HDL 47.0 47.3 50.1 46.7   TRIG 182* 209* 161* 129       Last Cardiology Tests:  ECG:      Echo:  Echo Results:  No results found for this or any previous visit from the past 3650 days.       Cath:      Stress Test:  Stress Results:  No results found for this or any previous visit from the past 365 days.         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  Relatively active for age.  No concerning cardiovascular findings on exam, blood pressure improved, weight loss is certainly helped her blood pressure and overall ASCVD risk.  Will get updated labs with her PCP soon.  No concerning symptoms.  Suggested 1 year follow-up thank you for allowing me to participate in her care    Orders:  Orders Placed This Encounter   Procedures    ECG 12 lead (Clinic Performed)      Followup Appts:  Future Appointments   Date Time Provider Department Center   6/27/2025 10:15 AM Timmy Velazquez MD [id number]           ____________________________________________________________  Andres Henry MD    Senior Attending Physician  [facility] [place]  [facility]       [1]   Past Medical History:  Diagnosis Date    Personal history of other diseases of the digestive system     History of esophageal reflux   [2] History reviewed. No pertinent surgical history.  [3] No Known Allergies

## 2025-06-06 LAB
ATRIAL RATE: 65 BPM
P AXIS: 30 DEGREES
P OFFSET: 198 MS
P ONSET: 142 MS
PR INTERVAL: 156 MS
Q ONSET: 220 MS
QRS COUNT: 10 BEATS
QRS DURATION: 70 MS
QT INTERVAL: 374 MS
QTC CALCULATION(BAZETT): 388 MS
QTC FREDERICIA: 383 MS
R AXIS: 22 DEGREES
T AXIS: 47 DEGREES
T OFFSET: 407 MS
VENTRICULAR RATE: 65 BPM

## 2025-06-20 ENCOUNTER — APPOINTMENT (OUTPATIENT)
Dept: PRIMARY CARE | Facility: CLINIC | Age: 75
End: 2025-06-20
Payer: MEDICARE

## 2025-06-27 ENCOUNTER — APPOINTMENT (OUTPATIENT)
Dept: PRIMARY CARE | Facility: CLINIC | Age: 75
End: 2025-06-27
Payer: MEDICARE

## 2025-06-27 VITALS
WEIGHT: 165.5 LBS | OXYGEN SATURATION: 100 % | HEART RATE: 55 BPM | HEIGHT: 63 IN | SYSTOLIC BLOOD PRESSURE: 126 MMHG | DIASTOLIC BLOOD PRESSURE: 72 MMHG | BODY MASS INDEX: 29.32 KG/M2

## 2025-06-27 DIAGNOSIS — Z00.00 HEALTH CARE MAINTENANCE: Primary | ICD-10-CM

## 2025-06-27 DIAGNOSIS — F32.A DEPRESSION, UNSPECIFIED DEPRESSION TYPE: ICD-10-CM

## 2025-06-27 DIAGNOSIS — I10 BENIGN ESSENTIAL HYPERTENSION: ICD-10-CM

## 2025-06-27 DIAGNOSIS — K21.9 GASTROESOPHAGEAL REFLUX DISEASE WITHOUT ESOPHAGITIS: ICD-10-CM

## 2025-06-27 DIAGNOSIS — E78.2 MIXED HYPERLIPIDEMIA: ICD-10-CM

## 2025-06-27 PROCEDURE — 3078F DIAST BP <80 MM HG: CPT | Performed by: INTERNAL MEDICINE

## 2025-06-27 PROCEDURE — 99397 PER PM REEVAL EST PAT 65+ YR: CPT | Performed by: INTERNAL MEDICINE

## 2025-06-27 PROCEDURE — 99214 OFFICE O/P EST MOD 30 MIN: CPT | Performed by: INTERNAL MEDICINE

## 2025-06-27 PROCEDURE — G0439 PPPS, SUBSEQ VISIT: HCPCS | Performed by: INTERNAL MEDICINE

## 2025-06-27 PROCEDURE — 1170F FXNL STATUS ASSESSED: CPT | Performed by: INTERNAL MEDICINE

## 2025-06-27 PROCEDURE — 1159F MED LIST DOCD IN RCRD: CPT | Performed by: INTERNAL MEDICINE

## 2025-06-27 PROCEDURE — 3074F SYST BP LT 130 MM HG: CPT | Performed by: INTERNAL MEDICINE

## 2025-06-27 PROCEDURE — 1160F RVW MEDS BY RX/DR IN RCRD: CPT | Performed by: INTERNAL MEDICINE

## 2025-06-27 PROCEDURE — 1036F TOBACCO NON-USER: CPT | Performed by: INTERNAL MEDICINE

## 2025-06-27 ASSESSMENT — ACTIVITIES OF DAILY LIVING (ADL)
TAKING_MEDICATION: INDEPENDENT
DRESSING: INDEPENDENT
DOING_HOUSEWORK: INDEPENDENT
MANAGING_FINANCES: INDEPENDENT
BATHING: INDEPENDENT
GROCERY_SHOPPING: INDEPENDENT

## 2025-06-27 NOTE — PROGRESS NOTES
"Subjective   Patient ID: Sully Chvaez is a 75 y.o. female who presents for Medicare Annual Wellness Visit Subsequent.    HPI CPE see updated front sheet no chest pain no shortness of breath no side effect with medication has lost weight has been feeling better overall no working bowel or reflux issues as much as before since the weight loss mood has been feeling well still has elected not to have mammograms or colonoscopies    Past medical history hypertension hyperlipidemia fatigue depression and anxiety    Medications including SSRI blood pressure medication    Allergies no known drug allergies    Social history no tobacco    Family history noted and unchanged no family history of colon cancer    Prevention has had follow-up with cardiology and EKG prior blood work review some exercise did not want colonoscopy or Cologuard    Depression not depressed but on medication    Review of Systems    Objective   /72 (BP Location: Right arm, Patient Position: Sitting, BP Cuff Size: Adult)   Pulse 55   Ht 1.6 m (5' 3\")   Wt 75.1 kg (165 lb 8 oz)   SpO2 100%   BMI 29.32 kg/m²     Physical Exam vital signs noted alert and oriented x3 NCAT PERRLA EOMI nares clear discharge OP benign TM normal bilateral EAC clear bilateral no AC nodes no JVD or bruit no thyromegaly chest clear to auscultation and percussion no crackles CV regular rate and rhythm S1-S2 without murmur gallop or rub abdomen soft nontender normal active bowel sounds no rebound or guarding no HSM LS spine normal curvature negative straight leg raise negative logroll negative SI joint tenderness extremities no clubbing cyanosis or edema normal distal pulses DTR 2+ and brisk musculoskeletal feels her hands and feet are cold objectively not in good blood pulses there  Assessment/Plan impression General medical examination hypertension hyperlipidemia cold hands s raynauds depression other diagnoses GERD  Plan check a Rezac advised on glucose potassium and " kidney function as well as liver function check CBC advised on blood count check lipid panel advised on cholesterol profile check TSH advised on thyroid and keeping hands warm and weather vasospasm blood pressure medicine depression medicines or others may be involved and then nonpharmacological treatment of depression and anxiety   continue with same medication good overall diet regular exercise good water consumption and recheck more regularly 3 months based on above TT 50 cc 26

## 2025-06-28 LAB
ALBUMIN SERPL-MCNC: 4.7 G/DL (ref 3.6–5.1)
ALP SERPL-CCNC: 104 U/L (ref 37–153)
ALT SERPL-CCNC: 19 U/L (ref 6–29)
ANION GAP SERPL CALCULATED.4IONS-SCNC: 11 MMOL/L (CALC) (ref 7–17)
AST SERPL-CCNC: 23 U/L (ref 10–35)
BILIRUB SERPL-MCNC: 0.8 MG/DL (ref 0.2–1.2)
BUN SERPL-MCNC: 44 MG/DL (ref 7–25)
CALCIUM SERPL-MCNC: 9.9 MG/DL (ref 8.6–10.4)
CHLORIDE SERPL-SCNC: 104 MMOL/L (ref 98–110)
CHOLEST SERPL-MCNC: 182 MG/DL
CHOLEST/HDLC SERPL: 3.9 (CALC)
CO2 SERPL-SCNC: 23 MMOL/L (ref 20–32)
CREAT SERPL-MCNC: 1.31 MG/DL (ref 0.6–1)
EGFRCR SERPLBLD CKD-EPI 2021: 42 ML/MIN/1.73M2
ERYTHROCYTE [DISTWIDTH] IN BLOOD BY AUTOMATED COUNT: 12.4 % (ref 11–15)
GLUCOSE SERPL-MCNC: 95 MG/DL (ref 65–99)
HCT VFR BLD AUTO: 39.2 % (ref 35–45)
HDLC SERPL-MCNC: 47 MG/DL
HGB BLD-MCNC: 12.7 G/DL (ref 11.7–15.5)
LDLC SERPL CALC-MCNC: 105 MG/DL (CALC)
MCH RBC QN AUTO: 30.1 PG (ref 27–33)
MCHC RBC AUTO-ENTMCNC: 32.4 G/DL (ref 32–36)
MCV RBC AUTO: 92.9 FL (ref 80–100)
NONHDLC SERPL-MCNC: 135 MG/DL (CALC)
PLATELET # BLD AUTO: 329 THOUSAND/UL (ref 140–400)
PMV BLD REES-ECKER: 11.2 FL (ref 7.5–12.5)
POTASSIUM SERPL-SCNC: 5.2 MMOL/L (ref 3.5–5.3)
PROT SERPL-MCNC: 7.7 G/DL (ref 6.1–8.1)
RBC # BLD AUTO: 4.22 MILLION/UL (ref 3.8–5.1)
SODIUM SERPL-SCNC: 138 MMOL/L (ref 135–146)
TRIGL SERPL-MCNC: 188 MG/DL
TSH SERPL-ACNC: 3.01 MIU/L (ref 0.4–4.5)
WBC # BLD AUTO: 8.8 THOUSAND/UL (ref 3.8–10.8)

## 2025-06-29 ASSESSMENT — ACTIVITIES OF DAILY LIVING (ADL)
BATHING: INDEPENDENT
DRESSING: INDEPENDENT
MANAGING_FINANCES: INDEPENDENT
GROCERY_SHOPPING: INDEPENDENT
TAKING_MEDICATION: INDEPENDENT
DOING_HOUSEWORK: INDEPENDENT

## 2025-06-29 ASSESSMENT — PATIENT HEALTH QUESTIONNAIRE - PHQ9
2. FEELING DOWN, DEPRESSED OR HOPELESS: NOT AT ALL
1. LITTLE INTEREST OR PLEASURE IN DOING THINGS: NOT AT ALL
SUM OF ALL RESPONSES TO PHQ9 QUESTIONS 1 AND 2: 0

## 2025-06-29 ASSESSMENT — ENCOUNTER SYMPTOMS
DEPRESSION: 0
OCCASIONAL FEELINGS OF UNSTEADINESS: 0
LOSS OF SENSATION IN FEET: 0

## 2025-08-01 ENCOUNTER — APPOINTMENT (OUTPATIENT)
Dept: PRIMARY CARE | Facility: CLINIC | Age: 75
End: 2025-08-01
Payer: MEDICARE

## 2025-08-01 VITALS — WEIGHT: 171 LBS | BODY MASS INDEX: 30.29 KG/M2 | SYSTOLIC BLOOD PRESSURE: 125 MMHG | DIASTOLIC BLOOD PRESSURE: 75 MMHG

## 2025-08-01 DIAGNOSIS — Z00.00 HEALTH CARE MAINTENANCE: Primary | ICD-10-CM

## 2025-08-01 DIAGNOSIS — I10 BENIGN ESSENTIAL HYPERTENSION: ICD-10-CM

## 2025-08-01 DIAGNOSIS — N28.9 RENAL INSUFFICIENCY: ICD-10-CM

## 2025-08-01 NOTE — PROGRESS NOTES
Subjective   Patient ID: Sully Chavez is a 75 y.o. female who presents for No chief complaint on file..    HPI   Follow-up visit with for spironolactone feels well did not gain any fluid her blood work was reviewed no chest pain no shortness of breath no other side effect with medication  Review of Systems    Objective   There were no vitals taken for this visit.    Physical Exam vital signs noted alert and oriented x 3 NCAT no JVD chest clear to auscultation CV regular rate and rhythm S1-S2 abdomen soft nontender normal active bowel sounds extremities no clubbing cyanosis or edema normal distal pulses    Assessment/Plan        Impression hypertension other renal related diagnosis potassium or creatinine  Plan she remains off of the spironolactone she would like to check her basic chemistry panel advised on glucose potassium and kidney function good diet regular exercise good water consumption during the remainder of the summer and recheck 3 months based on above

## 2025-08-07 LAB
ANION GAP SERPL CALCULATED.4IONS-SCNC: 11 MMOL/L (CALC) (ref 7–17)
BUN SERPL-MCNC: 34 MG/DL (ref 7–25)
BUN/CREAT SERPL: 30 (CALC) (ref 6–22)
CALCIUM SERPL-MCNC: 9.8 MG/DL (ref 8.6–10.4)
CHLORIDE SERPL-SCNC: 107 MMOL/L (ref 98–110)
CO2 SERPL-SCNC: 24 MMOL/L (ref 20–32)
CREAT SERPL-MCNC: 1.14 MG/DL (ref 0.6–1)
EGFRCR SERPLBLD CKD-EPI 2021: 50 ML/MIN/1.73M2
GLUCOSE SERPL-MCNC: 72 MG/DL (ref 65–99)
POTASSIUM SERPL-SCNC: 4.9 MMOL/L (ref 3.5–5.3)
SODIUM SERPL-SCNC: 142 MMOL/L (ref 135–146)

## 2025-11-05 ENCOUNTER — APPOINTMENT (OUTPATIENT)
Dept: PRIMARY CARE | Facility: CLINIC | Age: 75
End: 2025-11-05
Payer: MEDICARE